# Patient Record
Sex: MALE | Race: WHITE | NOT HISPANIC OR LATINO | URBAN - METROPOLITAN AREA
[De-identification: names, ages, dates, MRNs, and addresses within clinical notes are randomized per-mention and may not be internally consistent; named-entity substitution may affect disease eponyms.]

---

## 2024-09-19 PROBLEM — R07.9 CHEST PAIN: Status: ACTIVE | Noted: 2024-09-19

## 2024-09-19 PROBLEM — I10 HYPERTENSION, BENIGN: Status: ACTIVE | Noted: 2024-09-19

## 2024-09-19 PROBLEM — E03.9 HYPOTHYROIDISM: Status: ACTIVE | Noted: 2024-09-19

## 2024-09-19 PROBLEM — E78.1 HYPERTRIGLYCERIDEMIA: Status: ACTIVE | Noted: 2024-09-19

## 2024-09-19 PROBLEM — G47.33 OBSTRUCTIVE SLEEP APNEA: Status: ACTIVE | Noted: 2024-09-19

## 2024-09-19 PROBLEM — S06.0XAA CONCUSSION: Status: ACTIVE | Noted: 2024-09-19

## 2024-09-19 PROBLEM — E11.9 DIABETES (MULTI): Status: ACTIVE | Noted: 2024-09-19

## 2024-09-19 RX ORDER — ICOSAPENT ETHYL 1 G/1
2 CAPSULE ORAL
COMMUNITY
Start: 2021-02-03

## 2024-09-19 RX ORDER — TEMAZEPAM 15 MG/1
15 CAPSULE ORAL NIGHTLY
COMMUNITY
Start: 2021-07-28

## 2024-09-19 RX ORDER — LORAZEPAM 1 MG/1
1 TABLET ORAL AS NEEDED
COMMUNITY

## 2024-09-19 RX ORDER — CARVEDILOL 3.12 MG/1
1 TABLET ORAL
COMMUNITY
Start: 2020-11-13

## 2024-09-19 RX ORDER — PRAZOSIN HYDROCHLORIDE 5 MG/1
2 CAPSULE ORAL NIGHTLY
COMMUNITY
Start: 2021-07-28

## 2024-09-19 RX ORDER — METFORMIN HYDROCHLORIDE 1000 MG/1
1 TABLET ORAL EVERY 12 HOURS
COMMUNITY

## 2024-09-19 RX ORDER — LEVOTHYROXINE SODIUM 50 UG/1
1 TABLET ORAL DAILY
COMMUNITY

## 2024-09-19 RX ORDER — MULTIVITAMIN
1 TABLET ORAL DAILY
COMMUNITY

## 2024-09-19 RX ORDER — RISPERIDONE 1 MG/1
1 TABLET ORAL 2 TIMES DAILY
COMMUNITY
Start: 2021-07-28

## 2024-09-19 RX ORDER — SERTRALINE HYDROCHLORIDE 50 MG/1
50 TABLET, FILM COATED ORAL DAILY
COMMUNITY
Start: 2020-10-28

## 2024-09-19 RX ORDER — SEMAGLUTIDE 1.34 MG/ML
1 INJECTION, SOLUTION SUBCUTANEOUS WEEKLY
COMMUNITY

## 2024-09-19 RX ORDER — MAGNESIUM 200 MG
200 TABLET ORAL DAILY
COMMUNITY
Start: 2021-08-13

## 2024-09-19 RX ORDER — ACETAMINOPHEN 500 MG
5000 TABLET ORAL DAILY
COMMUNITY

## 2024-09-19 RX ORDER — ENALAPRIL MALEATE 2.5 MG/1
5 TABLET ORAL DAILY
COMMUNITY
Start: 2020-09-11

## 2024-09-19 RX ORDER — ROSUVASTATIN CALCIUM 20 MG/1
1 TABLET, COATED ORAL DAILY
COMMUNITY
Start: 2020-09-30

## 2024-09-19 RX ORDER — MELATONIN 3 MG
3 CAPSULE ORAL NIGHTLY
COMMUNITY
Start: 2021-02-03

## 2024-09-19 RX ORDER — LANOLIN ALCOHOL/MO/W.PET/CERES
1 CREAM (GRAM) TOPICAL DAILY
COMMUNITY
Start: 2020-10-07

## 2024-09-19 RX ORDER — ASPIRIN 81 MG/1
1 TABLET ORAL DAILY
COMMUNITY

## 2024-09-19 RX ORDER — ROSUVASTATIN CALCIUM 10 MG/1
10 TABLET, COATED ORAL DAILY
COMMUNITY
Start: 2022-03-23

## 2024-09-19 RX ORDER — MELOXICAM 7.5 MG/1
7.5 TABLET ORAL AS NEEDED
COMMUNITY
Start: 2019-07-12

## 2024-09-24 ENCOUNTER — APPOINTMENT (OUTPATIENT)
Dept: CARDIOLOGY | Facility: CLINIC | Age: 49
End: 2024-09-24
Payer: COMMERCIAL

## 2024-09-24 VITALS
BODY MASS INDEX: 36.41 KG/M2 | SYSTOLIC BLOOD PRESSURE: 144 MMHG | HEIGHT: 76 IN | DIASTOLIC BLOOD PRESSURE: 82 MMHG | WEIGHT: 299 LBS | HEART RATE: 86 BPM

## 2024-09-24 DIAGNOSIS — G47.33 OBSTRUCTIVE SLEEP APNEA: ICD-10-CM

## 2024-09-24 DIAGNOSIS — E78.1 HYPERTRIGLYCERIDEMIA: ICD-10-CM

## 2024-09-24 DIAGNOSIS — I10 HYPERTENSION, BENIGN: ICD-10-CM

## 2024-09-24 DIAGNOSIS — R07.9 CHEST PAIN, UNSPECIFIED TYPE: Primary | ICD-10-CM

## 2024-09-24 PROCEDURE — 1036F TOBACCO NON-USER: CPT | Performed by: INTERNAL MEDICINE

## 2024-09-24 PROCEDURE — 93005 ELECTROCARDIOGRAM TRACING: CPT | Performed by: INTERNAL MEDICINE

## 2024-09-24 PROCEDURE — 4010F ACE/ARB THERAPY RXD/TAKEN: CPT | Performed by: INTERNAL MEDICINE

## 2024-09-24 PROCEDURE — 99214 OFFICE O/P EST MOD 30 MIN: CPT | Performed by: INTERNAL MEDICINE

## 2024-09-24 PROCEDURE — 3077F SYST BP >= 140 MM HG: CPT | Performed by: INTERNAL MEDICINE

## 2024-09-24 PROCEDURE — 3079F DIAST BP 80-89 MM HG: CPT | Performed by: INTERNAL MEDICINE

## 2024-09-24 PROCEDURE — 3008F BODY MASS INDEX DOCD: CPT | Performed by: INTERNAL MEDICINE

## 2024-09-24 PROCEDURE — 93010 ELECTROCARDIOGRAM REPORT: CPT | Performed by: INTERNAL MEDICINE

## 2024-09-24 NOTE — PATIENT INSTRUCTIONS
Thanks for following up in office today.    1)  We reviewed your most recent cholesterol labs.  I want you to  adjust your diet and start to exercise regular moderate intensity exercise 150 minutes a week to help lower your triglycerides.  We will give you a handout with examples of moderate intensity exercise.  Your heart rate goal is 145 this is 85% of your max predicted heart rate.    2)  Have your cholesterol lab rechecked in 6 months and follow up with me after.    3)  Please continue your cardiac medications as prescribed.    Follow up with -me in 6  months  If you have any questions, please call [phone] and choose option for Dr. Jones's nurse Mana Her

## 2024-09-24 NOTE — PROGRESS NOTES
Chief Complaint:   No chief complaint on file.     History Of Present Illness:    José Miguel Carter is a 48 y.o. male presenting for yearly patient   H/o mild nonobstructive CAD, atypical chest pain, HTN, DM, TEE s/p uvuloplasty (has mouthpiece/nosepiece), ? Hypothyroidism.    Feels like his [facility] been [facility]  [facility]:      HDL 34  LDL 49  Hb A1c 6.3  Doing well from CV perspective.  No SOB, LE edema, palps, LH/dizziness, presyncope. Rare chest pains - which he believes are anxiety related, and resolve when he takes anxiety medication.  He has a service animal now.      ROS:  The remainder of the review of systems was obtained, as was negative as pertains to the chief complaint.    CV labs and testing:  Labs 3/2023 chol 126  HDL 34  LDL 56  trig 183    Coronary angiogram 9/30/20:   mild nonobstructive CAD   Exercise MPI Stress test 7/31/20:  hest pressure occurred 5 minutes into recovery. MPI no evidence of myocardial infarction or ischemia. LVEF 52%. Mild LV dysfunction.      TTE 7/30/20:   Nl LV function EF 55%, mild cLVH, normal diastology, normal RV sz/fcn, trace MR, no AS/AR, trace TR, no pericardial effusion     CT Chest 7/30/20:  Technically compromised study with suboptimal contrast opacification at pulmonary arterial vasculature and significant streak artifact. Nondiagnostic to evaluate for PE            Prior history:  The patient came to me for a second opinion regarding episodes of chest pain - described as a pressure in the R side of the chest radiating to the left side. + Associated rapid palpitations, shortness of breath, lightheadedness, nausea. 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 He has also noticed elevated BP episodically, with one BP reading of 200/140 at one point, associated with mental status change and disorientation.  Yesterday  he had an episode of chest pain, SOB, and L arm tingling.  José Miguel was evaluated at [facility] in [place], [place] in July for chest pain. He had testing as follows:     Last Recorded Vitals:  There were no vitals filed for this visit.    Past Medical History:  He has a past medical history of Atherosclerotic heart disease of native coronary artery without angina pectoris (02/11/2022).    Past Surgical History:  He has a past surgical history that includes Other surgical history (09/10/2020) and Other surgical history (09/10/2020).      Social History:  He has no history on file for tobacco use, alcohol use, and drug use.    Family History:  No family history on file.     Allergies:  Patient has no allergy information on record.    Outpatient Medications:  Current Outpatient Medications   Medication Instructions    aspirin 81 mg EC tablet 1 tablet, oral, Daily    carvedilol (Coreg) 3.125 mg tablet 1 tablet, oral, 2 times daily (morning and late afternoon)    cholecalciferol (VITAMIN D-3) 5,000 Units, oral, Daily    cyanocobalamin (Vitamin B-12) 1,000 mcg tablet 1 tablet, oral, Daily    empagliflozin (JARDIANCE) 25 mg, oral, Daily    enalapril (VASOTEC) 5 mg, oral, Daily    icosapent ethyL (VASCEPA) 2 g, oral, 2 times daily (morning and late afternoon)    levothyroxine (Synthroid, Levoxyl) 50 mcg tablet 1 tablet, oral, Daily    LORazepam (ATIVAN) 1 mg, oral, As needed    magnesium 200 mg, oral, Daily    melatonin 3 mg, oral, Nightly    meloxicam (MOBIC) 7.5 mg, oral, As needed    metFORMIN (Glucophage) 1,000 mg tablet 1 tablet, oral, Every 12 hours    multivitamin tablet 1 tablet, oral, Daily    Ozempic 1 mg, subcutaneous, Weekly    prazosin (Minipress) 5 mg capsule 2 capsules, oral, Nightly    risperiDONE (RISPERDAL) 1 mg, oral, 2 times daily, .5mg nightly, 1mg daily    rosuvastatin (Crestor) 20 mg tablet 1 tablet, oral, Daily    rosuvastatin (CRESTOR) 10 mg, oral, Daily    sertraline (ZOLOFT) 50 mg, oral,  "Daily    temazepam (RESTORIL) 15 mg, oral, Nightly       Physical Exam:  Physical Exam  HENT:      Head: Normocephalic.      Nose: Nose normal.      Mouth/Throat:      Mouth: Mucous membranes are moist.   Cardiovascular:      Rate and Rhythm: Normal rate and regular rhythm.      Comments: Flow murmur BUSB  No leg swelling   Np carotid bruits  Bilateral +2/4 radial pulses   Pulmonary:      Effort: Pulmonary effort is normal.      Breath sounds: Normal breath sounds.   Abdominal:      Palpations: Abdomen is soft.   Musculoskeletal:         General: Normal range of motion.      Cervical back: Normal range of motion.   Skin:     General: Skin is warm and dry.   Neurological:      General: No focal deficit present.      Mental Status: He is alert.   Psychiatric:         Mood and Affect: Mood normal.            Last Labs:  CBC -  No results found for: \"WBC\", \"HGB\", \"HCT\", \"MCV\", \"PLT\"    CMP -  No results found for: \"CALCIUM\", \"PHOS\", \"PROT\", \"ALBUMIN\", \"AST\", \"ALT\", \"ALKPHOS\", \"BILITOT\"    LIPID PANEL -   No results found for: \"CHOL\", \"TRIG\", \"HDL\", \"CHHDL\", \"LDLF\", \"VLDL\", \"NHDL\"    RENAL FUNCTION PANEL -   No results found for: \"GLUCOSE\", \"NA\", \"K\", \"CL\", \"CO2\", \"ANIONGAP\", \"BUN\", \"CREATININE\", \"GFRMALE\", \"CALCIUM\", \"PHOS\", \"ALBUMIN\"     No results found for: \"BNP\", \"HGBA1C\"          Assessment/Plan       Impression:  HTN  mild nonobstructive CAD  DL - hypertriglyceridemia with low HDL  TEE - s/p uvuloplasty and uses appliances  Anxiety    BP slightly elevated today > repeat 128/82  [facility].  Of note, TG's are elevated compared with last year - HbA1c 6.3 - we discussed that this may be 2/2 high simple carb and fast food intake -  I advised dietary modification.     Plan:  -reg moderate intensity exercise 150 nubs.week  -reduce saturated/trans fat - to have FLP [facility] prior to next appt  -spot check BP's at home, to call if exceeding goal BP < 130/80  -continue current meds including " rosuvastatin  -healthy cardiac lifestyle mods as discussed  -continue to f/u with PCP for optimal DM management  -continue to use TEE appliances     F/u 6 mos

## 2025-03-18 ENCOUNTER — TELEPHONE (OUTPATIENT)
Dept: CARDIOLOGY | Facility: HOSPITAL | Age: 50
End: 2025-03-18
Payer: COMMERCIAL

## 2025-03-18 NOTE — TELEPHONE ENCOUNTER
Pt LVM requesting an order for lipid panel be placed. Per Dr. Jones's last OV, recheck labs in 6months. Routing to Oly ANDERSON for further assistance.     [facility] with [facility] and may need order faxed to them once placed.

## 2025-03-19 DIAGNOSIS — E78.1 HYPERTRIGLYCERIDEMIA: Primary | ICD-10-CM

## 2025-03-19 NOTE — TELEPHONE ENCOUNTER
RN spoke with pts wife at this time, they were not able to get [facility] prior to Dr. Jones's appointment. Pt is in [place] and is requesting we fax it to [facility] to obtain labs. Labwork was faxed.

## 2025-03-25 ENCOUNTER — APPOINTMENT (OUTPATIENT)
Dept: CARDIOLOGY | Facility: HOSPITAL | Age: 50
End: 2025-03-25
Payer: COMMERCIAL

## 2025-04-01 ENCOUNTER — OFFICE VISIT (OUTPATIENT)
Dept: CARDIOLOGY | Facility: HOSPITAL | Age: 50
End: 2025-04-01
Payer: COMMERCIAL

## 2025-04-01 VITALS
WEIGHT: 289 LBS | BODY MASS INDEX: 35.19 KG/M2 | SYSTOLIC BLOOD PRESSURE: 118 MMHG | HEART RATE: 87 BPM | DIASTOLIC BLOOD PRESSURE: 78 MMHG | HEIGHT: 76 IN

## 2025-04-01 DIAGNOSIS — E78.1 HYPERTRIGLYCERIDEMIA: ICD-10-CM

## 2025-04-01 DIAGNOSIS — G47.33 OBSTRUCTIVE SLEEP APNEA: ICD-10-CM

## 2025-04-01 DIAGNOSIS — I10 HYPERTENSION, BENIGN: ICD-10-CM

## 2025-04-01 DIAGNOSIS — R07.9 CHEST PAIN, UNSPECIFIED TYPE: ICD-10-CM

## 2025-04-01 LAB
ATRIAL RATE: 87 BPM
P AXIS: 69 DEGREES
P OFFSET: 188 MS
P ONSET: 136 MS
PR INTERVAL: 158 MS
Q ONSET: 215 MS
QRS COUNT: 14 BEATS
QRS DURATION: 88 MS
QT INTERVAL: 356 MS
QTC CALCULATION(BAZETT): 428 MS
QTC FREDERICIA: 402 MS
R AXIS: 36 DEGREES
T AXIS: 43 DEGREES
T OFFSET: 393 MS
VENTRICULAR RATE: 87 BPM

## 2025-04-01 PROCEDURE — 99214 OFFICE O/P EST MOD 30 MIN: CPT | Performed by: INTERNAL MEDICINE

## 2025-04-01 PROCEDURE — 1036F TOBACCO NON-USER: CPT | Performed by: INTERNAL MEDICINE

## 2025-04-01 PROCEDURE — 3074F SYST BP LT 130 MM HG: CPT | Performed by: INTERNAL MEDICINE

## 2025-04-01 PROCEDURE — 93005 ELECTROCARDIOGRAM TRACING: CPT | Performed by: INTERNAL MEDICINE

## 2025-04-01 PROCEDURE — 4010F ACE/ARB THERAPY RXD/TAKEN: CPT | Performed by: INTERNAL MEDICINE

## 2025-04-01 PROCEDURE — 3078F DIAST BP <80 MM HG: CPT | Performed by: INTERNAL MEDICINE

## 2025-04-01 PROCEDURE — 3008F BODY MASS INDEX DOCD: CPT | Performed by: INTERNAL MEDICINE

## 2025-04-01 PROCEDURE — 99214 OFFICE O/P EST MOD 30 MIN: CPT | Mod: 25 | Performed by: INTERNAL MEDICINE

## 2025-04-01 RX ORDER — CARVEDILOL 3.12 MG/1
3.12 TABLET ORAL
Qty: 180 TABLET | Refills: 3 | Status: SHIPPED | OUTPATIENT
Start: 2025-04-01 | End: 2026-04-01

## 2025-04-01 RX ORDER — ICOSAPENT ETHYL 1 G/1
2 CAPSULE ORAL
Qty: 360 CAPSULE | Refills: 3 | Status: SHIPPED | OUTPATIENT
Start: 2025-04-01 | End: 2026-04-01

## 2025-04-01 RX ORDER — ROSUVASTATIN CALCIUM 10 MG/1
30 TABLET, COATED ORAL DAILY
Qty: 270 TABLET | Refills: 3 | Status: SHIPPED | OUTPATIENT
Start: 2025-04-01 | End: 2026-04-01

## 2025-04-01 RX ORDER — ENALAPRIL MALEATE 2.5 MG/1
5 TABLET ORAL DAILY
Qty: 180 TABLET | Refills: 3 | Status: SHIPPED | OUTPATIENT
Start: 2025-04-01 | End: 2026-04-01

## 2025-04-01 NOTE — PATIENT INSTRUCTIONS
Thanks for following up in office today.    1)  Continue your lifestyle and diet modifications to continue lowering your triglycerides. Your goal is under 150.    2)  I encourage you to exercise at a moderate intensity for 150 min per week. This can be brisk walking, about 3-4 mph.    3)  Please continue your cardiac medications as prescribed. I refilled your medications today and provided you with scripts.    Follow up with Dr. Jones in 1 year  If you have any questions, please call us at [phone]

## 2025-04-01 NOTE — PROGRESS NOTES
Chief Complaint:   No chief complaint on file.     History Of Present Illness:    José Miguel Carter is a 49 y.o. male presenting for 6-month follow-up.  H/o mild nonobstructive CAD, atypical chest pain, HTN, DL, DM, TEE s/p uvuloplasty (has mouthpiece/nosepiece), ? Hypothyroidism.     Last seen by me in 9/2024 for their annual appointment. At the time, he reported rare chest pains which he believes are anxiety related, and resolve when he takes anxiety medication (lorazepam). He has a service animal. Recent TG still elevated at 205 but trending down from [facility] - we discussed previously this may be 2/2 high simple carb and fast food intake.    Today, Pt confirms weight loss - per chart review, he lost 10 lbs since last fall. He has been doing lifestyle modifications and taking Ozempic.     He got a new job in September. He has a pet which helps with stress.     Review Of Systems:  The remainder of the review of systems was obtained, and was negative as pertains to the chief complaint.    CV testing and labs reviewed:    Labs 6/2024:  K 4.1  BUN 10  Cr 0.91  AST 15  ALT 24  H&H 14.6 41.3  HgA1C 6.3  Lipid labs 3/2025:    HDL 30  LDL 37    [facility]:     HDL 34  LDL 49     Coronary angiogram 9/30/20:   mild nonobstructive CAD   Exercise MPI Stress test 7/31/20:  hest pressure occurred 5 minutes into recovery. MPI no evidence of myocardial infarction or ischemia. LVEF 52%. Mild LV dysfunction.       TTE 7/30/20:   Nl LV function EF 55%, mild cLVH, normal diastology, normal RV sz/fcn, trace MR, no AS/AR, trace TR, no pericardial effusion      CT Chest 7/30/20:  Technically compromised study with suboptimal contrast opacification at pulmonary arterial vasculature and significant streak artifact. Nondiagnostic to evaluate for PE    Prior Hx:  The patient came to me for a second opinion regarding episodes of chest pain - described as a pressure in the R side of the chest  radiating to the left side. + Associated rapid palpitations, shortness of breath, lightheadedness, nausea. He works from home currently but has a stressful job in  - he does report significant stress/anxiety with his job. He has also noticed elevated BP episodically, with one BP reading of 200/140 at one point, associated with mental status change and disorientation.  He also had an episode of chest pain, SOB, and L arm tingling.  José Miguel was evaluated at [facility] in [place], [place] in July for chest pain.    Last Recorded Vitals:  There were no vitals filed for this visit.    Past Medical History:  He has a past medical history of Atherosclerotic heart disease of native coronary artery without angina pectoris (02/11/2022).    Past Surgical History:  He has a past surgical history that includes Other surgical history (09/10/2020) and Other surgical history (09/10/2020).      Social History:  He reports that he has never smoked. He has never used smokeless tobacco. He reports that he does not currently use alcohol. He reports that he does not use drugs.    Family History:  No family history on file.     Allergies:  Patient has no known allergies.    Outpatient Medications:  Current Outpatient Medications   Medication Instructions    aspirin 81 mg EC tablet 1 tablet, Daily    carvedilol (Coreg) 3.125 mg tablet 1 tablet, 2 times daily (morning and late afternoon)    cholecalciferol (VITAMIN D-3) 5,000 Units, Daily    cyanocobalamin (Vitamin B-12) 1,000 mcg tablet 1 tablet, Daily    empagliflozin (JARDIANCE) 25 mg, Daily    enalapril (VASOTEC) 5 mg, Daily    icosapent ethyL (VASCEPA) 2 g, 2 times daily (morning and late afternoon)    levothyroxine (Synthroid, Levoxyl) 50 mcg tablet 1 tablet, Daily    LORazepam (ATIVAN) 1 mg, As needed    magnesium 200 mg, Daily    melatonin 3 mg, Nightly    meloxicam (MOBIC) 7.5 mg, As needed    metFORMIN (Glucophage) 1,000 mg tablet 1 tablet, Every 12 hours     "multivitamin tablet 1 tablet, Daily    Ozempic 1 mg, Weekly    prazosin (Minipress) 5 mg capsule 2 capsules, Nightly    risperiDONE (RISPERDAL) 1 mg, 2 times daily    rosuvastatin (Crestor) 20 mg tablet 1 tablet, Daily    rosuvastatin (CRESTOR) 10 mg, Daily    sertraline (ZOLOFT) 50 mg, Daily    temazepam (RESTORIL) 15 mg, Nightly       Physical Exam:  Physical Exam  Vitals reviewed.   HENT:      Head: Normocephalic.      Nose: Nose normal.      Mouth/Throat:      Mouth: Mucous membranes are moist.   Cardiovascular:      Rate and Rhythm: Normal rate and regular rhythm.      Heart sounds: No murmur heard.  Pulmonary:      Effort: Pulmonary effort is normal.      Breath sounds: Normal breath sounds.   Abdominal:      General: Abdomen is flat.      Palpations: Abdomen is soft.   Musculoskeletal:         General: Normal range of motion.      Cervical back: Normal range of motion.      Right lower leg: No edema.      Left lower leg: No edema.   Skin:     General: Skin is warm and dry.   Neurological:      General: No focal deficit present.      Mental Status: He is alert.   Psychiatric:         Mood and Affect: Mood normal.        Last Labs:  CBC -  No results found for: \"WBC\", \"HGB\", \"HCT\", \"MCV\", \"PLT\"    CMP -  No results found for: \"CALCIUM\", \"PHOS\", \"PROT\", \"ALBUMIN\", \"AST\", \"ALT\", \"ALKPHOS\", \"BILITOT\"    LIPID PANEL -   No results found for: \"CHOL\", \"TRIG\", \"HDL\", \"CHHDL\", \"LDLF\", \"VLDL\", \"NHDL\"    RENAL FUNCTION PANEL -   No results found for: \"GLUCOSE\", \"NA\", \"K\", \"CL\", \"CO2\", \"ANIONGAP\", \"BUN\", \"CREATININE\", \"GFRMALE\", \"CALCIUM\", \"PHOS\", \"ALBUMIN\"     No results found for: \"BNP\", \"HGBA1C\"    Assessment/Plan   HTN  mild nonobstructive CAD  DL - hypertriglyceridemia with low HDL  TEE - s/p uvuloplasty and uses appliances  Anxiety     /78  [facility]  Of note, TG's are trending down compared with last year - HbA1c 6.3 - we discussed that this may be 2/2 high simple carb and fast food intake -  " I advised dietary modification and exercise.     Plan:  -reg moderate intensity exercise 150 min/week  -reduce saturated/trans fat - to [facility]   -spot check BP's at home, to call if exceeding goal BP < 130/80  -continue current meds including rosuvastatin  -healthy cardiac lifestyle mods as discussed  -continue to f/u with PCP for optimal DM management  -continue to use TEE appliances    Scribe Attestation  By signing my name below, I, Amanda Botello, Scribe   attest that this documentation has been prepared under the direction and in the presence of Adan Jones MD.

## 2025-05-29 ENCOUNTER — TELEPHONE (OUTPATIENT)
Dept: CARDIOLOGY | Facility: HOSPITAL | Age: 50
End: 2025-05-29
Payer: COMMERCIAL

## 2025-05-29 NOTE — TELEPHONE ENCOUNTER
Patients PCP office called (Dr. Joanne Sanders) asking if patient can start a new medication for ED called SILDENAFIL office asks for a call back to discuss #[phone] and FAX [phone]

## 2025-06-04 ENCOUNTER — TELEPHONE (OUTPATIENT)
Dept: CARDIOLOGY | Facility: HOSPITAL | Age: 50
End: 2025-06-04
Payer: COMMERCIAL

## 2025-06-04 NOTE — TELEPHONE ENCOUNTER
RN called Dr. Sanders's office at this time. No answer at this time, RN left message regarding ED medications and Per Dr. Jnoes she is ok with him being on that he can not be on nitroglycerin while on this ED med.

## 2026-04-02 ENCOUNTER — APPOINTMENT (OUTPATIENT)
Dept: CARDIOLOGY | Facility: HOSPITAL | Age: 51
End: 2026-04-02
Payer: COMMERCIAL